# Patient Record
Sex: MALE | Race: ASIAN | NOT HISPANIC OR LATINO
[De-identification: names, ages, dates, MRNs, and addresses within clinical notes are randomized per-mention and may not be internally consistent; named-entity substitution may affect disease eponyms.]

---

## 2023-02-23 ENCOUNTER — APPOINTMENT (OUTPATIENT)
Dept: CARDIOLOGY | Facility: CLINIC | Age: 33
End: 2023-02-23
Payer: MEDICAID

## 2023-02-23 VITALS
DIASTOLIC BLOOD PRESSURE: 80 MMHG | SYSTOLIC BLOOD PRESSURE: 117 MMHG | BODY MASS INDEX: 31.15 KG/M2 | WEIGHT: 230 LBS | OXYGEN SATURATION: 96 % | TEMPERATURE: 97.9 F | HEART RATE: 93 BPM | HEIGHT: 72 IN | RESPIRATION RATE: 16 BRPM

## 2023-02-23 DIAGNOSIS — Z87.891 PERSONAL HISTORY OF NICOTINE DEPENDENCE: ICD-10-CM

## 2023-02-23 DIAGNOSIS — R07.89 OTHER CHEST PAIN: ICD-10-CM

## 2023-02-23 DIAGNOSIS — Z86.39 PERSONAL HISTORY OF OTHER ENDOCRINE, NUTRITIONAL AND METABOLIC DISEASE: ICD-10-CM

## 2023-02-23 DIAGNOSIS — Z78.9 OTHER SPECIFIED HEALTH STATUS: ICD-10-CM

## 2023-02-23 DIAGNOSIS — I51.7 CARDIOMEGALY: ICD-10-CM

## 2023-02-23 PROBLEM — Z00.00 ENCOUNTER FOR PREVENTIVE HEALTH EXAMINATION: Status: ACTIVE | Noted: 2023-02-23

## 2023-02-23 PROCEDURE — 99204 OFFICE O/P NEW MOD 45 MIN: CPT

## 2023-03-03 ENCOUNTER — APPOINTMENT (OUTPATIENT)
Dept: CARDIOLOGY | Facility: CLINIC | Age: 33
End: 2023-03-03
Payer: MEDICAID

## 2023-03-03 VITALS — DIASTOLIC BLOOD PRESSURE: 80 MMHG | SYSTOLIC BLOOD PRESSURE: 130 MMHG | TEMPERATURE: 97.2 F

## 2023-03-03 DIAGNOSIS — I45.10 UNSPECIFIED RIGHT BUNDLE-BRANCH BLOCK: ICD-10-CM

## 2023-03-03 PROBLEM — I51.7 ENLARGED RV (RIGHT VENTRICLE): Status: ACTIVE | Noted: 2023-03-03

## 2023-03-03 PROCEDURE — 93306 TTE W/DOPPLER COMPLETE: CPT

## 2023-03-15 NOTE — ASSESSMENT
[FreeTextEntry1] : 32 year old M with history of HLD who presents for evaluation of abnormal ECG.\par \par Pt reports that for the past month, he has been having intermittent L sided chest discomfort lasting several minutes at a time. There are no clear triggers and it is not related to exertion. It feels "uncomfortable" on the inside of L chest. He denies any SOB, orthopnea, PND or LE edema. He was seen by PCP who obtained an ECG showing RBBB and was sent to cardiology for evaluation. He believes he was told his ECG was also abnormal in the past. He reports history of smoking for 2 years in the past, but quit a few years ago.\par \par 1) chest discomfort\par 2) RBBB\par - ECG from PCP reviewed showing NSR with RBBB\par - Chest discomfort is atypical in nature and not related to exertion\par - Given RBBB, chest discomfort, and prior history of smoking, I advised pt to undergo TTE to r/o structural heart disease - pt completed 3/3/23 which showed normal LVEF 60% but mildly enlarged right ventricle with mildly decreased RV systolic function\par - Given young age, RBBB, and echo findings, I will advise pt to undergo cardiac MRI to accurately assess RV size and function and r/o congenital heart disease/shunts\par \par 3) Follow-up, pending cardiac MRI

## 2023-03-15 NOTE — HISTORY OF PRESENT ILLNESS
[FreeTextEntry1] : 32 year old M with history of HLD who presents for evaluation of abnormal ECG.\par \par Pt reports that for the past month, he has been having intermittent L sided chest discomfort lasting several minutes at a time. There are no clear triggers and it is not related to exertion. It feels "uncomfortable" on the inside of L chest. He denies any SOB, orthopnea, PND or LE edema. He was seen by PCP who obtained an ECG showing RBBB and was sent to cardiology for evaluation. He believes he was told his ECG was also abnormal in the past. He reports history of smoking for 2 years in the past, but quit a few years ago.\par

## 2023-03-22 ENCOUNTER — NON-APPOINTMENT (OUTPATIENT)
Age: 33
End: 2023-03-22